# Patient Record
(demographics unavailable — no encounter records)

---

## 2025-03-21 NOTE — PHYSICAL EXAM
[Alert] : alert [No Acute Distress] : no acute distress [EOMI] : extra ocular movement intact [Normal Hearing] : hearing was normal [Thyroid Not Enlarged] : the thyroid was not enlarged [No Respiratory Distress] : no respiratory distress [No Accessory Muscle Use] : no accessory muscle use [Normal Rate and Effort] : normal respiratory rate and effort [Clear to Auscultation] : lungs were clear to auscultation bilaterally [Normal S1, S2] : normal S1 and S2 [Normal Rate] : heart rate was normal [No Edema] : no peripheral edema [Pedal Pulses Normal] : the pedal pulses are present [Normal Bowel Sounds] : normal bowel sounds [Not Tender] : non-tender [Not Distended] : not distended [Soft] : abdomen soft [Spine Straight] : spine straight [No Stigmata of Cushings Syndrome] : no stigmata of Cushings Syndrome [Normal Gait] : normal gait [No Joint Swelling] : no joint swelling seen [Right foot was examined, including] : right foot ~C was examined, including visual inspection with sensory and pulse exams [Left foot was examined, including] : left foot ~C was examined, including visual inspection with sensory and pulse exams [Normal] : normal [2+] : 2+ in the dorsalis pedis [Oriented x3] : oriented to person, place, and time [Normal Insight/Judgement] : insight and judgment were intact [Kyphosis] : no kyphosis present

## 2025-03-21 NOTE — ASSESSMENT
[FreeTextEntry1] : Type 2 DM, not well controlled, with complications including   Current regimen: Glipizide 10mg daily   Referred by PCP for elevated postprandial glucose and A1c above goal Home blood glucose monitoring reveals FBS , PPBS 180-200, with occasional fasting lows 2hr PPBS in office -- 262 mg/dL after vegetables and meat (above goal) Given fasting hypoglycemia and postprandial excursions, will switch to a secretagogue with shorter half life like repaglinide. Fasting hypos likely in part d/t her preference for small dinner but using repaglinide only with real meals will hopefully allow for this preferred style.  Goal A1c <7%, A1c 8.9% Goal BP <130/80, BP today 143/77, not on regimen Goal LDL <100mg/dL, recent , recently stopped atorvastatin daily (advised to restart)   Plan -- Screen for albuminuria -- Stop Glipizide -- Start Januvia 100mg daily and repaglinide 1mg with breakfast and lunch  -- Continue checking BG at home, FBS/PPBS to write on log and bring to next appt. -- Reviewed and encouraged lifestyle modifications for good DM care including aiming for 150 min moderate exercise weekly and ADA healthy eating tips. -- Reviewed hypoglycemia protocol (15/15 rule) -- Continue to follow up with ophthalmologist. Podiatry referral given -- Follow up with MD in 2-3 months

## 2025-03-21 NOTE — ADDENDUM
[FreeTextEntry1] : 3/21/25 Called pt with  ID [id number] to discuss results from yesterday's visit ACR 81 -- she had recently stopped her losartan (advised resumption of medication). Will also consider SGLT2 if glucose still above goal at next visit. Advised good glucose control to avoid worsening of albuminuria. She is aware; will call to schedule a 3 month follow up visit as discussed.

## 2025-03-21 NOTE — HISTORY OF PRESENT ILLNESS
[FreeTextEntry1] : Hazel Canchola is a 64 year old female who presents to establish care for Diabetes Mellitus Type 2.   PMHx: HTN, HLD, osteoarthritis of left knee, glaucoma PSHx: cataract surgery (B/L),  section () FMHx: DM (father, mother, brothers, sisters), prostate cancer (3 brothers) NKA    ID: [id number]  Was referred by her PCP, Dr. Shabbir Moffett for DM management; PCP had told her that her blood glucose is elevated, especially after eating.   Diagnosed with Diabetes in , via routine bloodwork. Denies hospitalizations for DM Currently denies polyuria, polydipsia, weight loss   POCT A1c in office: 8.9% FS in office: 262 ~2.5hrs PPBS -- had vegetable salad with meat around 11:30 AM   Current Regimen: -- Glipizide 10mg daily, takes 2hrs after breakfast   Previous regimens:  -- Glucovance (glyburide and metformin) -- took in [facility], no longer taking since moved to the . Had GI upset/nausea while taking d/t metformin    Fingersticks: uses contour next machine/strips FBS:  PPBS: 180-200, sometimes higher Hypoglycemia: fasting (gets shaky which wakes her up, BG is 59-75, corrects w/honey which helps BG increase to 90)   - Diet: Doesn't eat between meals because she doesn't want BG to increase. B-- eggs w/vegetables or oatmeal or oatmeal bread L -- salad with protein (beef or chicken or fish) Doesn't really eat dinner but may have tea and oatmeal at night Early in AM or late at night, feels hungry but doesn't eat (drinks water). No soda, juice.  Eats kiwi, green bananas, pears, strawberries for fruits, occasional orange  - Exercise: very little exercise but she is active taking care of her home   Complications + neuropathy + retinopathy Denies albuminuria, CVD events    Ophthalmologist: + DR (right eye). Last saw 2025 -- has appt on 3/24/25 w/eye surgeon. Has been receiving injections for glaucoma.  Podiatrist: has not seen   Social Hx: No alcohol, nicotine, drug use Does not work Lives with daughter's father Children -- 1 daughter [place] , [place] [facility]   Current medications: Glipizide 10mg daily, Losartan, Atorvastatin -- stopped Losartan and Atorvastatin as she's been drinking herbal tea

## 2025-03-21 NOTE — HISTORY OF PRESENT ILLNESS
[FreeTextEntry1] : Hazel Canchola is a 64 year old female who presents to establish care for Diabetes Mellitus Type 2.   PMHx: HTN, HLD, osteoarthritis of left knee, glaucoma PSHx: cataract surgery (B/L),  section () FMHx: DM (father, mother, brothers, sisters), prostate cancer (3 brothers) NKA    ID: [id number]  Was referred by her PCP, Dr. Shabbir Moffett for DM management; PCP had told her that her blood glucose is elevated, especially after eating.   Diagnosed with Diabetes in , via routine bloodwork. Denies hospitalizations for DM Currently denies polyuria, polydipsia, weight loss   POCT A1c in office: 8.9% FS in office: 262 ~2.5hrs PPBS -- had vegetable salad with meat around 11:30 AM   Current Regimen: -- Glipizide 10mg daily, takes 2hrs after breakfast   Previous regimens:  -- Glucovance (glyburide and metformin) -- took in [facility], no longer taking since moved to the . Had GI upset/nausea while taking d/t metformin    Fingersticks: uses contour next machine/strips FBS:  PPBS: 180-200, sometimes higher Hypoglycemia: fasting (gets shaky which wakes her up, BG is 59-75, corrects w/honey which helps BG increase to 90)   - Diet: Doesn't eat between meals because she doesn't want BG to increase. B-- eggs w/vegetables or oatmeal or oatmeal bread L -- salad with protein (beef or chicken or fish) Doesn't really eat dinner but may have tea and oatmeal at night Early in AM or late at night, feels hungry but doesn't eat (drinks water). No soda, juice.  Eats kiwi, green bananas, pears, strawberries for fruits, occasional orange  - Exercise: very little exercise but she is active taking care of her home   Complications + neuropathy + retinopathy Denies albuminuria, CVD events    Ophthalmologist: + DR (right eye). Last saw 2025 -- has appt on 3/24/25 w/eye surgeon. Has been receiving injections for glaucoma.  Podiatrist: has not seen   Social Hx: No alcohol, nicotine, drug use Does not work Lives with daughter's father Children -- 1 daughter in , [place] [facility] [place] [facility]   Current medications: Glipizide 10mg daily, Losartan, Atorvastatin -- stopped Losartan and Atorvastatin as she's been drinking herbal tea

## 2025-03-21 NOTE — ADDENDUM
[FreeTextEntry1] : 3/21/25 Called pt with  ID [id number] to discuss results from yesterday's visit ACR 81 -- she had recently stopped her losartan (advised resumption of medication). Will also consider SGLT2 if glucose still above goal at next visit. Advised good glucose control to avoid worsening of albuminuria. She is aware; will call to schedule a 3 month follow up visit as discussed.